# Patient Record
Sex: FEMALE | Race: WHITE | NOT HISPANIC OR LATINO | URBAN - METROPOLITAN AREA
[De-identification: names, ages, dates, MRNs, and addresses within clinical notes are randomized per-mention and may not be internally consistent; named-entity substitution may affect disease eponyms.]

---

## 2017-03-27 ENCOUNTER — EMERGENCY (EMERGENCY)
Facility: HOSPITAL | Age: 37
LOS: 1 days | Discharge: HOME | End: 2017-03-27
Attending: EMERGENCY MEDICINE | Admitting: EMERGENCY MEDICINE
Payer: MEDICAID

## 2017-03-27 VITALS
RESPIRATION RATE: 18 BRPM | DIASTOLIC BLOOD PRESSURE: 66 MMHG | TEMPERATURE: 98 F | SYSTOLIC BLOOD PRESSURE: 103 MMHG | OXYGEN SATURATION: 100 % | HEART RATE: 100 BPM

## 2017-03-27 VITALS — HEART RATE: 80 BPM

## 2017-03-27 DIAGNOSIS — O99.89 OTHER SPECIFIED DISEASES AND CONDITIONS COMPLICATING PREGNANCY, CHILDBIRTH AND THE PUERPERIUM: ICD-10-CM

## 2017-03-27 DIAGNOSIS — M54.9 DORSALGIA, UNSPECIFIED: ICD-10-CM

## 2017-03-27 DIAGNOSIS — R21 RASH AND OTHER NONSPECIFIC SKIN ERUPTION: ICD-10-CM

## 2017-03-27 DIAGNOSIS — Z3A.01 LESS THAN 8 WEEKS GESTATION OF PREGNANCY: ICD-10-CM
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
## 2017-03-27 DIAGNOSIS — M54.42 LUMBAGO WITH SCIATICA, LEFT SIDE: ICD-10-CM
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
## 2017-03-27 LAB
BLD GP AB SCN SERPL QL: NEGATIVE — SIGNIFICANT CHANGE UP
HCG SERPL-ACNC: SIGNIFICANT CHANGE UP MIU/ML
RH IG SCN BLD-IMP: POSITIVE — SIGNIFICANT CHANGE UP
RH IG SCN BLD-IMP: POSITIVE — SIGNIFICANT CHANGE UP

## 2017-03-27 PROCEDURE — 86850 RBC ANTIBODY SCREEN: CPT

## 2017-03-27 PROCEDURE — 99284 EMERGENCY DEPT VISIT MOD MDM: CPT | Mod: 25

## 2017-03-27 PROCEDURE — 99285 EMERGENCY DEPT VISIT HI MDM: CPT

## 2017-03-27 PROCEDURE — 86900 BLOOD TYPING SEROLOGIC ABO: CPT

## 2017-03-27 PROCEDURE — 86901 BLOOD TYPING SEROLOGIC RH(D): CPT

## 2017-03-27 PROCEDURE — 84702 CHORIONIC GONADOTROPIN TEST: CPT

## 2017-03-27 PROCEDURE — 76817 TRANSVAGINAL US OBSTETRIC: CPT | Mod: 26

## 2017-03-27 PROCEDURE — 76817 TRANSVAGINAL US OBSTETRIC: CPT

## 2017-03-27 RX ORDER — DIAZEPAM 5 MG
10 TABLET ORAL ONCE
Qty: 0 | Refills: 0 | Status: DISCONTINUED | OUTPATIENT
Start: 2017-03-27 | End: 2017-03-27

## 2017-03-27 RX ADMIN — Medication 10 MILLIGRAM(S): at 13:39

## 2017-03-27 NOTE — ED ADULT NURSE NOTE - OBJECTIVE STATEMENT
36 yr old female to ED A&Ox3 presents with +back pain and hives. Pt had a fall a couple of weeks ago. Pt was seen by a friend that is a physician. Pt also c/o hives that started yesterday. States that she gets hives when stressed. Denies any new detergents, soaps, or perfumes. Denies any numbness, tingling or weakness to ext. No bowel or urinary incontinence. Pt is ambulating around ED. No acute resp distress noted. Skin warm, dry and intact

## 2017-03-27 NOTE — ED PROCEDURE NOTE - PROCEDURE ADDITIONAL DETAILS
POCUS, early OB: Emergency Department Focused Ultrasound performed at patient's bedside.  The complete report will be available in PACS.

## 2017-03-27 NOTE — ED PROVIDER NOTE - ATTENDING CONTRIBUTION TO CARE
------------ATTENDING NOTE------------   37 yo (twins) @ 6 wk EGA by LMP alone (found out today in ED), c/o resolved hives yesterday (no additional allergic symptoms or complaints or known exposures), c/o intermittent mild/moderate L lower back pain radiating down back of L leg (none now, normal neuro exam, no recent trauma, no incontinence/retention), IUP w/o complication in ED, safe at home, in depth d/w pt about ddx, tx, floyd, stan.  - Lion Zuleta MD   ------------------------------------------------------------------

## 2017-03-27 NOTE — ED PROVIDER NOTE - OBJECTIVE STATEMENT
36 year old female presenting with back pain x 2 weeks after a fall. She has been taking "Soma" for it at home. She also had hives yesterday that she attributes to stress and have now resolved but concerned her last night. No known new foods or detergents. No trouble breathing. She is also concerned that she may be pregnant but is not sure if she wants to be tested for it here. 36 year old female presenting with back pain x 2 weeks after a fall. She has been taking "Soma" for it at home. She also had hives yesterday that she attributes to stress and have now resolved but concerned her last night. No known new foods or detergents. No trouble breathing. She is also concerned that she may be pregnant but is not sure if she wants to be tested for it here. Denies vaginal discharge, vaginal bleeding, abdominal pain, or dizziness 36 year old female presenting with back pain x 2 weeks after a fall. She has been taking "Soma" for it at home. She also had hives yesterday that she attributes to stress and have now resolved but concerned her last night. No known new foods or detergents. No trouble breathing. She is also concerned that she may be pregnant but is not sure if she wants to be tested for it here. Denies vaginal discharge, vaginal bleeding, abdominal pain, or dizziness. LMP feb 13

## 2017-03-27 NOTE — ED PROVIDER NOTE - MEDICAL DECISION MAKING DETAILS
36 year old female with back pain, feels she may be pregnant. Will work up for r/o ectopic pregnancy - starting with urine pregnancy and if positive - type and screen, ultrasound. Patient declines pain medication at this time. Will reassess

## 2017-03-27 NOTE — ED PROVIDER NOTE - PLAN OF CARE
Take Tylenol as needed for pain. Take Benadryl as needed for rash. Follow up with your primary doctor tomorrow. Return to the Emergency Dept if you develop any new or worsening symptoms

## 2017-03-27 NOTE — ED PROVIDER NOTE - CARE PLAN
Principal Discharge DX:	Back pain Principal Discharge DX:	Back pain  Instructions for follow-up, activity and diet:	Take Tylenol as needed for pain. Take Benadryl as needed for rash. Follow up with your primary doctor tomorrow. Return to the Emergency Dept if you develop any new or worsening symptoms  Secondary Diagnosis:	Rash Principal Discharge DX:	Sciatica of left side  Instructions for follow-up, activity and diet:	Take Tylenol as needed for pain. Take Benadryl as needed for rash. Follow up with your primary doctor tomorrow. Return to the Emergency Dept if you develop any new or worsening symptoms  Secondary Diagnosis:	Rash  Secondary Diagnosis:	Intrauterine pregnancy, incidental

## 2017-06-01 ENCOUNTER — EMERGENCY (EMERGENCY)
Facility: HOSPITAL | Age: 37
LOS: 1 days | Discharge: HOME | End: 2017-06-01
Attending: EMERGENCY MEDICINE | Admitting: EMERGENCY MEDICINE
Payer: MEDICAID

## 2017-06-01 VITALS
TEMPERATURE: 98 F | DIASTOLIC BLOOD PRESSURE: 77 MMHG | SYSTOLIC BLOOD PRESSURE: 111 MMHG | RESPIRATION RATE: 20 BRPM | HEART RATE: 115 BPM | OXYGEN SATURATION: 97 %

## 2017-06-01 DIAGNOSIS — R30.0 DYSURIA: ICD-10-CM

## 2017-06-01 LAB
ALBUMIN SERPL ELPH-MCNC: 4.2 G/DL — SIGNIFICANT CHANGE UP (ref 3.3–5)
ALP SERPL-CCNC: 76 U/L — SIGNIFICANT CHANGE UP (ref 40–120)
ALT FLD-CCNC: 11 U/L RC — SIGNIFICANT CHANGE UP (ref 10–45)
ANION GAP SERPL CALC-SCNC: 16 MMOL/L — SIGNIFICANT CHANGE UP (ref 5–17)
APPEARANCE UR: ABNORMAL
AST SERPL-CCNC: 17 U/L — SIGNIFICANT CHANGE UP (ref 10–40)
BACTERIA # UR AUTO: ABNORMAL /HPF
BILIRUB SERPL-MCNC: 0.9 MG/DL — SIGNIFICANT CHANGE UP (ref 0.2–1.2)
BILIRUB UR-MCNC: NEGATIVE — SIGNIFICANT CHANGE UP
BUN SERPL-MCNC: 12 MG/DL — SIGNIFICANT CHANGE UP (ref 7–23)
CALCIUM SERPL-MCNC: 9.3 MG/DL — SIGNIFICANT CHANGE UP (ref 8.4–10.5)
CHLORIDE SERPL-SCNC: 100 MMOL/L — SIGNIFICANT CHANGE UP (ref 96–108)
CO2 SERPL-SCNC: 22 MMOL/L — SIGNIFICANT CHANGE UP (ref 22–31)
COLOR SPEC: YELLOW — SIGNIFICANT CHANGE UP
COMMENT - URINE: SIGNIFICANT CHANGE UP
CREAT SERPL-MCNC: 0.49 MG/DL — LOW (ref 0.5–1.3)
DIFF PNL FLD: NEGATIVE — SIGNIFICANT CHANGE UP
EPI CELLS # UR: SIGNIFICANT CHANGE UP /HPF
GLUCOSE SERPL-MCNC: 90 MG/DL — SIGNIFICANT CHANGE UP (ref 70–99)
GLUCOSE UR QL: NEGATIVE — SIGNIFICANT CHANGE UP
HCT VFR BLD CALC: 35.4 % — SIGNIFICANT CHANGE UP (ref 34.5–45)
HGB BLD-MCNC: 12.5 G/DL — SIGNIFICANT CHANGE UP (ref 11.5–15.5)
HYALINE CASTS # UR AUTO: ABNORMAL
KETONES UR-MCNC: ABNORMAL
LEUKOCYTE ESTERASE UR-ACNC: NEGATIVE — SIGNIFICANT CHANGE UP
MAGNESIUM SERPL-MCNC: 2.1 MG/DL — SIGNIFICANT CHANGE UP (ref 1.6–2.6)
MCHC RBC-ENTMCNC: 33.6 PG — SIGNIFICANT CHANGE UP (ref 27–34)
MCHC RBC-ENTMCNC: 35.3 GM/DL — SIGNIFICANT CHANGE UP (ref 32–36)
MCV RBC AUTO: 95.2 FL — SIGNIFICANT CHANGE UP (ref 80–100)
NITRITE UR-MCNC: NEGATIVE — SIGNIFICANT CHANGE UP
PH UR: 6 — SIGNIFICANT CHANGE UP (ref 5–8)
PHOSPHATE SERPL-MCNC: 2.8 MG/DL — SIGNIFICANT CHANGE UP (ref 2.5–4.5)
PLATELET # BLD AUTO: 250 K/UL — SIGNIFICANT CHANGE UP (ref 150–400)
POTASSIUM SERPL-MCNC: 3.3 MMOL/L — LOW (ref 3.5–5.3)
POTASSIUM SERPL-SCNC: 3.3 MMOL/L — LOW (ref 3.5–5.3)
PROT SERPL-MCNC: 7.2 G/DL — SIGNIFICANT CHANGE UP (ref 6–8.3)
PROT UR-MCNC: SIGNIFICANT CHANGE UP
RBC # BLD: 3.72 M/UL — LOW (ref 3.8–5.2)
RBC # FLD: 11.1 % — SIGNIFICANT CHANGE UP (ref 10.3–14.5)
RBC CASTS # UR COMP ASSIST: SIGNIFICANT CHANGE UP /HPF (ref 0–2)
SODIUM SERPL-SCNC: 138 MMOL/L — SIGNIFICANT CHANGE UP (ref 135–145)
SP GR SPEC: 1.02 — SIGNIFICANT CHANGE UP (ref 1.01–1.02)
UROBILINOGEN FLD QL: NEGATIVE — SIGNIFICANT CHANGE UP
WBC # BLD: 8.1 K/UL — SIGNIFICANT CHANGE UP (ref 3.8–10.5)
WBC # FLD AUTO: 8.1 K/UL — SIGNIFICANT CHANGE UP (ref 3.8–10.5)
WBC UR QL: SIGNIFICANT CHANGE UP /HPF (ref 0–5)

## 2017-06-01 PROCEDURE — 76805 OB US >/= 14 WKS SNGL FETUS: CPT | Mod: 26

## 2017-06-01 PROCEDURE — 93010 ELECTROCARDIOGRAM REPORT: CPT

## 2017-06-01 PROCEDURE — 93975 VASCULAR STUDY: CPT | Mod: 26

## 2017-06-01 PROCEDURE — 99285 EMERGENCY DEPT VISIT HI MDM: CPT | Mod: 25

## 2017-06-01 RX ORDER — SODIUM CHLORIDE 9 MG/ML
1000 INJECTION INTRAMUSCULAR; INTRAVENOUS; SUBCUTANEOUS ONCE
Qty: 0 | Refills: 0 | Status: COMPLETED | OUTPATIENT
Start: 2017-06-01 | End: 2017-06-01

## 2017-06-01 RX ORDER — DIAZEPAM 5 MG
5 TABLET ORAL ONCE
Qty: 0 | Refills: 0 | Status: DISCONTINUED | OUTPATIENT
Start: 2017-06-01 | End: 2017-06-01

## 2017-06-01 RX ORDER — CEPHALEXIN 500 MG
1 CAPSULE ORAL
Qty: 20 | Refills: 0 | OUTPATIENT
Start: 2017-06-01 | End: 2017-06-11

## 2017-06-01 RX ORDER — DIPHENHYDRAMINE HCL 50 MG
50 CAPSULE ORAL ONCE
Qty: 0 | Refills: 0 | Status: DISCONTINUED | OUTPATIENT
Start: 2017-06-01 | End: 2017-06-05

## 2017-06-01 RX ORDER — DIAZEPAM 5 MG
0 TABLET ORAL
Qty: 0 | Refills: 0 | COMMUNITY

## 2017-06-01 RX ORDER — SODIUM CHLORIDE 9 MG/ML
1000 INJECTION, SOLUTION INTRAVENOUS
Qty: 0 | Refills: 0 | Status: DISCONTINUED | OUTPATIENT
Start: 2017-06-01 | End: 2017-06-05

## 2017-06-01 RX ORDER — CEFTRIAXONE 500 MG/1
1 INJECTION, POWDER, FOR SOLUTION INTRAMUSCULAR; INTRAVENOUS EVERY 24 HOURS
Qty: 0 | Refills: 0 | Status: DISCONTINUED | OUTPATIENT
Start: 2017-06-02 | End: 2017-06-05

## 2017-06-01 RX ORDER — PHENAZOPYRIDINE HCL 100 MG
200 TABLET ORAL ONCE
Qty: 0 | Refills: 0 | Status: COMPLETED | OUTPATIENT
Start: 2017-06-01 | End: 2017-06-01

## 2017-06-01 RX ORDER — CEFTRIAXONE 500 MG/1
1 INJECTION, POWDER, FOR SOLUTION INTRAMUSCULAR; INTRAVENOUS ONCE
Qty: 0 | Refills: 0 | Status: COMPLETED | OUTPATIENT
Start: 2017-06-01 | End: 2017-06-01

## 2017-06-01 RX ORDER — SODIUM CHLORIDE 9 MG/ML
1000 INJECTION, SOLUTION INTRAVENOUS ONCE
Qty: 0 | Refills: 0 | Status: COMPLETED | OUTPATIENT
Start: 2017-06-01 | End: 2017-06-01

## 2017-06-01 RX ORDER — SODIUM CHLORIDE 9 MG/ML
1000 INJECTION, SOLUTION INTRAVENOUS ONCE
Qty: 0 | Refills: 0 | Status: COMPLETED | OUTPATIENT
Start: 2017-06-01 | End: 2017-06-02

## 2017-06-01 RX ORDER — ZOLPIDEM TARTRATE 10 MG/1
0 TABLET ORAL
Qty: 0 | Refills: 0 | COMMUNITY

## 2017-06-01 RX ORDER — CEFTRIAXONE 500 MG/1
INJECTION, POWDER, FOR SOLUTION INTRAMUSCULAR; INTRAVENOUS
Qty: 0 | Refills: 0 | Status: DISCONTINUED | OUTPATIENT
Start: 2017-06-01 | End: 2017-06-05

## 2017-06-01 RX ORDER — POTASSIUM CHLORIDE 20 MEQ
40 PACKET (EA) ORAL ONCE
Qty: 0 | Refills: 0 | Status: COMPLETED | OUTPATIENT
Start: 2017-06-01 | End: 2017-06-01

## 2017-06-01 RX ADMIN — Medication 200 MILLIGRAM(S): at 22:19

## 2017-06-01 RX ADMIN — SODIUM CHLORIDE 1000 MILLILITER(S): 9 INJECTION INTRAMUSCULAR; INTRAVENOUS; SUBCUTANEOUS at 17:04

## 2017-06-01 RX ADMIN — Medication 5 MILLIGRAM(S): at 19:21

## 2017-06-01 RX ADMIN — CEFTRIAXONE 100 GRAM(S): 500 INJECTION, POWDER, FOR SOLUTION INTRAMUSCULAR; INTRAVENOUS at 18:36

## 2017-06-01 RX ADMIN — Medication 5 MILLIGRAM(S): at 22:18

## 2017-06-01 RX ADMIN — SODIUM CHLORIDE 1000 MILLILITER(S): 9 INJECTION INTRAMUSCULAR; INTRAVENOUS; SUBCUTANEOUS at 15:26

## 2017-06-01 RX ADMIN — SODIUM CHLORIDE 2000 MILLILITER(S): 9 INJECTION, SOLUTION INTRAVENOUS at 20:08

## 2017-06-01 RX ADMIN — Medication 40 MILLIEQUIVALENT(S): at 17:04

## 2017-06-01 NOTE — ED PROVIDER NOTE - GASTROINTESTINAL, MLM
Abdomen soft, nondistended, no rebound, guarding or rigidity. +TTP of suprapubic region. Rectal: external skin tags, hemorrhoids, guaiac negative

## 2017-06-01 NOTE — ED PROVIDER NOTE - NS ED MD SCRIBE ATTENDING SCRIBE SECTIONS
PAST MEDICAL/SURGICAL/SOCIAL HISTORY/VITAL SIGNS( Pullset)/HIV/REVIEW OF SYSTEMS/HISTORY OF PRESENT ILLNESS/PHYSICAL EXAM/INTAKE ASSESSMENT/SCREENINGS

## 2017-06-01 NOTE — ED PROVIDER NOTE - PLAN OF CARE
1) Please take antibiotics as directed  2) Drink lots of water  3) Drink cranberry juice and eat bananas  4) Follow with an Obstetrician  5) Follow with your regular doctor

## 2017-06-01 NOTE — ED PROVIDER NOTE - CARE PLAN
Principal Discharge DX:	UTI (urinary tract infection)  Instructions for follow-up, activity and diet:	1) Please take antibiotics as directed  2) Drink lots of water  3) Drink cranberry juice and eat bananas  4) Follow with an Obstetrician  5) Follow with your regular doctor

## 2017-06-01 NOTE — ED ADULT NURSE REASSESSMENT NOTE - NS ED NURSE REASSESS COMMENT FT1
pt is resting comfortably in bed, using cell phone and listening to music. c.o rash/itchy feeling and burning while urinating. c.o anxiety as well. pt medicated per order .    Pt resting comfortably with VSS, no complaints at this time. Patient's bed in the lowest position, explained plan of care to patient and family members. Will continue to monitor.
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
## 2017-06-01 NOTE — ED ADULT NURSE REASSESSMENT NOTE - NS ED NURSE REASSESS COMMENT FT1
pt states that during her pregnancy she has had intermittent rashes. tonight rashes appeared on her inner thighs and then on her chest. states that her face feels like its burning and that she felt this feeling before but feels that its worse. discussing plan of care with MD Simpson,.

## 2017-06-01 NOTE — ED PROVIDER NOTE - PROGRESS NOTE DETAILS
Patient is tolerating PO, texting on phone, will recheck orthostatics, will treat as a UTI. - CKT Pt vitals have improved throughout ED stay, no longer orthostatic.  tolerates po intake (ate full dinner tray).  ambulatory.  Patient asking for valium and for ambien; explained ambien is cat C for pregnancy, not safe.  Pt stating she does not want to go home, does not know why she gets rash or night sweats; explained to patient vitals, labs are reassuring; that she need additional medical f/u but no indication fo radmission at this time.  Consulted OB/GYN to see patient as patient also concerned about pregnancy, about effects of pregnancy on her body and about being able to terminate pregnancy (at this time patient is unsure if she wishes to keep pregnancy).  OBGYN saw patient, no additional recommendations beyond good outpatient f/u; have provided [facility], [facility] for pregnancy and Family planning clinic with Dr Garcia.  Patient has remained well appearing and stable throughout my ED shift.  Antonio Lewis

## 2017-06-01 NOTE — ED ADULT NURSE REASSESSMENT NOTE - NS ED NURSE REASSESS COMMENT FT1
pt feeling better after fluids, still unable to go to the bathroom again. states the feeling in her legs has decreased.
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
## 2017-06-02 VITALS
DIASTOLIC BLOOD PRESSURE: 67 MMHG | SYSTOLIC BLOOD PRESSURE: 99 MMHG | OXYGEN SATURATION: 100 % | TEMPERATURE: 98 F | HEART RATE: 103 BPM | RESPIRATION RATE: 17 BRPM

## 2017-06-02 LAB
CULTURE RESULTS: NO GROWTH — SIGNIFICANT CHANGE UP
SPECIMEN SOURCE: SIGNIFICANT CHANGE UP

## 2017-06-02 PROCEDURE — 96374 THER/PROPH/DIAG INJ IV PUSH: CPT

## 2017-06-02 PROCEDURE — 93975 VASCULAR STUDY: CPT

## 2017-06-02 PROCEDURE — 99284 EMERGENCY DEPT VISIT MOD MDM: CPT | Mod: 25

## 2017-06-02 PROCEDURE — 87086 URINE CULTURE/COLONY COUNT: CPT

## 2017-06-02 PROCEDURE — 84100 ASSAY OF PHOSPHORUS: CPT

## 2017-06-02 PROCEDURE — 93005 ELECTROCARDIOGRAM TRACING: CPT

## 2017-06-02 PROCEDURE — 81001 URINALYSIS AUTO W/SCOPE: CPT

## 2017-06-02 PROCEDURE — 83735 ASSAY OF MAGNESIUM: CPT

## 2017-06-02 PROCEDURE — 82272 OCCULT BLD FECES 1-3 TESTS: CPT

## 2017-06-02 PROCEDURE — 76805 OB US >/= 14 WKS SNGL FETUS: CPT

## 2017-06-02 PROCEDURE — 80053 COMPREHEN METABOLIC PANEL: CPT

## 2017-06-02 PROCEDURE — 85027 COMPLETE CBC AUTOMATED: CPT

## 2017-06-02 RX ORDER — PHENAZOPYRIDINE HCL 100 MG
1 TABLET ORAL
Qty: 6 | Refills: 0 | OUTPATIENT
Start: 2017-06-02 | End: 2017-06-04

## 2017-06-02 RX ADMIN — SODIUM CHLORIDE 2000 MILLILITER(S): 9 INJECTION, SOLUTION INTRAVENOUS at 00:08

## 2017-06-05 NOTE — ED POST DISCHARGE NOTE - RESULT SUMMARY
Dr. Butler calls from radiology to state that he was reviewing OB US done for this patient, noting that study was performed by general radiology techs and not performed by OB techs as it should have been. He states the study is therefore not adequate and patient needs to have full anatomy scan in order to provide adequate imaging.

## 2022-01-01 NOTE — ED PROVIDER NOTE - PMH
Vital signs stable. [place] assessment WDL. Infant breastfeeding on cue with minimal assist. Assistance provided with positioning/latch. Infant is meeting age appropriate voids and stools. Baby was supp via syringe and formula at breast due to fussiness, parents may cont supp for this reason.  Mom feels her milk is coming in.  Bonding well with parents. Will continue with current plan of care.    Panic attack

## 2022-07-27 NOTE — ED PROVIDER NOTE - CONDUCTED A DETAILED DISCUSSION WITH PATIENT AND/OR GUARDIAN REGARDING, MDM
return to ED if symptoms worsen, persist or questions arise/need for outpatient follow-up/radiology results/lab results Referring Physician (Optional): Carla
